# Patient Record
Sex: FEMALE | URBAN - METROPOLITAN AREA
[De-identification: names, ages, dates, MRNs, and addresses within clinical notes are randomized per-mention and may not be internally consistent; named-entity substitution may affect disease eponyms.]

---

## 2019-08-06 ENCOUNTER — CLINICAL SUPPORT (OUTPATIENT)
Dept: URGENT CARE | Facility: CLINIC | Age: 59
End: 2019-08-06

## 2019-08-06 VITALS
HEIGHT: 62 IN | RESPIRATION RATE: 16 BRPM | BODY MASS INDEX: 30.55 KG/M2 | SYSTOLIC BLOOD PRESSURE: 104 MMHG | OXYGEN SATURATION: 97 % | HEART RATE: 81 BPM | DIASTOLIC BLOOD PRESSURE: 70 MMHG | WEIGHT: 166 LBS | TEMPERATURE: 98 F

## 2019-08-06 DIAGNOSIS — R19.7 NAUSEA, VOMITING, AND DIARRHEA: ICD-10-CM

## 2019-08-06 DIAGNOSIS — E11.8 TYPE 2 DIABETES MELLITUS WITH COMPLICATION, UNSPECIFIED WHETHER LONG TERM INSULIN USE: Primary | ICD-10-CM

## 2019-08-06 DIAGNOSIS — R11.2 NAUSEA, VOMITING, AND DIARRHEA: ICD-10-CM

## 2019-08-06 LAB — GLUCOSE SERPL-MCNC: 362 MG/DL (ref 70–110)

## 2019-08-06 PROCEDURE — 82962 GLUCOSE BLOOD TEST: CPT | Mod: ,,, | Performed by: NURSE PRACTITIONER

## 2019-08-06 PROCEDURE — 82962 POCT GLUCOSE, HAND-HELD DEVICE: ICD-10-PCS | Mod: ,,, | Performed by: NURSE PRACTITIONER

## 2019-08-06 PROCEDURE — 99203 PR OFFICE/OUTPT VISIT, NEW, LEVL III, 30-44 MIN: ICD-10-PCS | Mod: 25,S$GLB,, | Performed by: NURSE PRACTITIONER

## 2019-08-06 PROCEDURE — 99203 OFFICE O/P NEW LOW 30 MIN: CPT | Mod: 25,S$GLB,, | Performed by: NURSE PRACTITIONER

## 2019-08-06 RX ORDER — METFORMIN HYDROCHLORIDE 1000 MG/1
1000 TABLET ORAL 2 TIMES DAILY WITH MEALS
COMMUNITY

## 2019-08-06 RX ORDER — GLYBURIDE 1.25 MG/1
5 TABLET ORAL
COMMUNITY

## 2019-08-06 RX ORDER — ONDANSETRON 8 MG/1
8 TABLET, ORALLY DISINTEGRATING ORAL EVERY 8 HOURS PRN
Qty: 9 TABLET | Refills: 0 | Status: SHIPPED | OUTPATIENT
Start: 2019-08-06 | End: 2019-08-09

## 2019-08-06 NOTE — PATIENT INSTRUCTIONS
Take your medications as prescribed. Take over the counter probiotics if you are having diarrhea. Follow up with your PCP if you are not improving in 3-5 days. Be sure to drink plenty of clear fluids to stay hydrated. Eat a bland diet. Return to the emergency department if you have vomiting despite medications, have no urine production or any other concerns. Refer to the additional material provided for further information.      Síndrome Viral [Viral Syndrome: Adult]  Cherelle enfermedad viral puede ocasionar diferentes síntomas que dependen de cuál sea la parte de wilcox cuerpo afectada por el virus. Si el virus se aloja en la nariz, la garganta o los pulmones, puede provocar tos, dolor de garganta, congestión y, en ocasiones, dolor de yamileth. Si se aloja en el estómago o el tracto intestinal, puede provocar vómitos y diarrea. En ocasiones, puede causar síntomas vagos, tales alanna dolor generalizado, sensación de cansancio, pérdida de apetito o fiebre.  Cherelle enfermedad viral suele durar entre cherelle y dos semanas; chichi vez un poco más. En algunos casos, cherelle infección más grave puede verse alanna un síndrome viral rodrigo los primeros días de la enfermedad. Puede que deban hacerle otro examen y análisis adicionales para saber de cuál de los dos casos se trata. Es importante que preste atención a los signos que se describen a continuación.  Cuidados en la casa  · Si los síntomas son graves, descanse en wilcox casa rodrigo los primeros dos o jaciel jerardo.  · Aléjese del humo del cigarrillo, tanto wilcox propio humo alanna el humo de otras personas.  · Puede usar acetaminofén o ibuprofeno para controlar la fiebre, el dolor muscular y el dolor de yamileth, a menos que le hayan recetado otro medicamento para esto. Si tiene cherelle enfermedad renal o hepática crónica o alguna vez tuvo úlcera estomacal o sangrado gastrointestinal, hable con wilcox médico antes de usar estos medicamentos. Ninguna persona saloni de 18 años que esté enferma con fiebre debería  timothy aspirina porque puede provocarle graves daños en el hígado.  · Es posible que tenga poco apetito, por lo que cherelle dieta ligera está celi. Para evitar la deshidratación, jasmyn entre 8 y 12 vasos de ocho onzas (250 cm3) de líquido cada día. Puede ser agua, jugo de naranja y limonada, jugo de manzana, uva y arándano, jugos de frutas samantha, reemplazo de electrolitos y bebidas deportivas, café y tés descafeinados. Si le diagnosticaron cherelle enfermedad renal, pregunte a wilcox médico cuánto y qué tipos de líquidos debería beber para prevenir la deshidratación. Si tiene cherelle enfermedad renal, beber demasiada cantidad de líquidos puede hacer que se acumule en el cuerpo, lo que puede ser peligroso para wilcox sweta.   · Los medicamentos de venta saturnino no reducirán la duración de la enfermedad, ethan pueden ser útiles para aliviar la tos, el dolor de garganta, y la congestión nasal y paranasal. No use descongestionantes si tiene la presión arterial carmelita.  Visitas de control  Dyan cherelle visita de control a wilcox proveedor de atención médica si no empieza a sentirse mejor rodrigo la semana próxima.  Cuándo debe buscar atención médica  Obtenga atención médica de inmediato si algo de lo siguiente ocurre:  · Tos con mucho esputo (mucosidad) de color o con jenna.  · Dolor en el pecho, falta de aire, silbidos o dificultad para respirar.  · Dolor de yamileth luca, dolor en la liza, el danie o los oídos.  · Dolor luca y baljeet en la parte inferior derecha de wilcox abdomen.  · Vómito persistente (no puede mantener los líquidos en el estómago).  · Diarrea frecuente (más de 5 veces al día); jenna (de color rojizo o negruzco) o mucosidad en la diarrea.  · Se siente débil, mareado o a punto de desmayarse.  · Mucha sed.  · Fiebre de 100.4ºF (38ºC) [oral] o más, que no mejora con los medicamentos para la fiebre.  · Convulsiones.   Date Last Reviewed: 9/25/2015  © 7411-7191 [employer]. [address] [place] [address], [place] [address]. Todos  los derechos reservados. Esta información no pretende sustituir la atención médica profesional. Sólo wilcox médico puede diagnosticar y tratar un problema de sweta.        Dieta para el vómito o la diarrea  Si vibha síntomas regresan o empeoran después de comer ciertos alimentos que se enumeran a continuación, debe dejar de comerlos hasta que se alivien los síntomas y usted se sienta mejor.    Nighat vez que pare el vómito, siga los siguientes pasos.  Richard las primeras 12 a 24 horas  Siga esta dieta:  · Bebidas: Agua corriente, bebidas para deportistas alanna soluciónes de electrolito, refrescos sin cafeína, agua mineral (común o saborizada), jugos de fruta samantha, té y café descafeinados.  · Sopas: Caldo mike y consomé.  · Postres: Gelatina común, barras heladas de agua y fruta. Cuando se sienta mejor, puede agregar 6-8 onzas de yogur por día.  Richard las siguientes 24 horas  Richard las siguientes 24 horas puede agregar los siguiente a lo que se mencionó anteriormente:  · Cereal caliente, tostadas solas, pan, panecillos o galletas.  · Tallarines solos, arroz, puré de patatas, sopa de tallarines con radha o de arroz.  · Fruta enlatada sin endulzar (evite la gomez) y bananas.  Limite el consumo de grasas a menos de 15 gramos por día evitando margarinas, manteca, aceites, mayonesa, salsas, condimentos, comidas fritas, mantequilla de maní, carne, radha y pescado.  Limite las fibras: evitando las verduras crudas o cocidas, frutas frescas (excepto bananas) y cereales de salvado.  Limite la cafeína y el chocolate. No use condimentos o especias con excepción de la hsanae.  Richard las siguientes 24 horas  Gradualmente retome la dieta normal a medida que se sienta mejor y desaparezcan los síntomas.  Date Last Reviewed: 11/14/2013  © 8014-5585 [employer]. [address] [place] [address], [place] [address]. Todos los derechos reservados. Esta información no pretende sustituir la atención médica profesional. Sólo wilcox médico puede  diagnosticar y tratar un problema de sweta.        Vómito Y Diarrea, No Específicos (Adultos)  [Vomiting And Diarrhea, Nonspecific (Adult)]    El vómito y la diarrea ayudan al cuerpo a eliminar sustancias dañinas alanna un virus (gastroenteritis) o ciertas bacterias (intoxicación por alimentos). Las alergias a algunos alimentos o medicamentos, así alanna un alto nivel de estrés o de ansiedad, también pueden causar vómito o diarrea. A menudo es difícil identificar la causa exacta de estos trastornos, incluso después de hacer pruebas.  El vómito y la diarrea suelen desaparecer sin problemas en 1 a 2 días. Keena si persisten, pueden causar deshidratación (pérdida excesiva de líquidos), lo cual puede ser un problema grave si se johan sin tratamiento.  Cuidados En [place]  Potter Precauciones Con Los Medicamentos.   Consulte con wilcox proveedor de atención médica antes de timothy medicamentos contra las náuseas o la diarrea. La diarrea y el vómito suelen ser mecanismos que el organismo utiliza para expulsar del cuerpo sustancias tóxicas o dañinas. Por lo tanto, los medicamentos que inhiben o impiden estos mecanismos podrían no ser recomendables. Ciertos medicamentos de venta saturnino pueden ayudar a aliviar el malestar estomacal. Pregunte a wilcox proveedor de atención médica si estos medicamentos pueden ser útiles para usted.  Potter Líquidos Para Evitar La Deshidratación.   · Si está deshidratado, el médico podría sugerirle que tome cherelle solución rehidratante alanna Pedialyte o Rehydralyte, disponibles sin receta en los supermercados y en las farmacias. Use solamente soluciones rehidratantes ya preparadas. No intente prepararlas usted mismo. La mezcla de agua, sal y electrolitos es el mejor método para reemplazar los líquidos y minerales que pierde cuando está enfermo. Si no puede obtener cherelle solución rehidratante, el Gatorade diluido en agua a partes iguales (cherelle taza de agua por cada taza de Gatorade) puede ser un sustituto adecuado.  · Potter  sorbos pequeños y frecuentes para evitar la pérdida excesiva de líquidos. Si johan de vomitar, puede ampliar wilcox dieta, ethan algunas cosas pueden empeorar la diarrea.  · Si la pita idea de beber líquido le produce náuseas, chupe pedazos de hielo.  · Evite las bebidas azucaradas alanna los jugos de fruta y los refrescos, ya que pueden empeorar la diarrea.  Reanude Lentamente Wilcox Dieta Normal.   A medida que va recuperando el apetito, puede empezar a comer lo que le guste. No es necesario que siga cherelle dieta especial. Al principio, evite las comidas pesadas, indigestas o con demasiada grasa, así alanna la leche de dayna. No coma demasiado de cherelle kenney vez. Es posible que le indiquen que evite ciertos alimentos hasta que se sienta mejor.  Lávese Las Melly.   Lavarse las melly a menudo ayuda a prevenir las infecciones.  Dyan cherelle VISITA DE CONTROL según le indique el médico o el personal del centro.  Obtenga Atención Médica Inmediata  en cualquiera de los siguientes casos:  · Vómitos o excrementos con jenna o de color negruzco.  · Dolor abdominal intenso y continuo, o cualquier tipo de dolor abdominal que empeora.  · Dolor de yamileth intenso o rigidez en el danie.  · Richard más de 12 horas no logra retener ni siquiera unos sorbos de líquido.  · Vómito que dura más de 24 horas.  · Diarrea que dura más de 24 horas.  · Fiebre de 100.4°F (38°C) o superior que dura más de 48 horas, o según le indique wilcox proveedor de atención médica.  · Coloración amarillenta en la piel o en el jung de los ojos.  · Señales de deshidratación, alanna resecamiento de la boca, poca orina (orina menos de cherelle vez cada 6 horas) u orina muy oscura.  · Debilidad, mareo o aturdimiento graves.  Date Last Reviewed: 1/3/2016  © 0888-6158 [employer]. [address] [place] [address], [place] [address]. Todos los derechos reservados. Esta información no pretende sustituir la atención médica profesional. Sólo wilcox médico puede diagnosticar y tratar un problema de  sweta.

## 2019-08-06 NOTE — PROGRESS NOTES
Subjective:       Patient ID: Ernestina Winkler is a 58 y.o. female.    Vitals:  vitals were not taken for this visit.     Chief Complaint: Emesis and Diarrhea    Pt has a  in the room that reported for her that she has has n/v/d since yesterday and she reports to feel very weak    Emesis    This is a new problem. The current episode started yesterday. The problem has been gradually worsening. The emesis has an appearance of stomach contents. Associated symptoms include diarrhea. She has tried increased fluids for the symptoms. The treatment provided no relief.   Diarrhea    This is a new problem. The current episode started yesterday. The problem occurs 5 to 10 times per day. The problem has been gradually worsening. The stool consistency is described as watery. The patient states that diarrhea awakens her from sleep. Associated symptoms include vomiting. Nothing aggravates the symptoms. She has tried nothing for the symptoms. The treatment provided no relief.       Gastrointestinal: Positive for vomiting and diarrhea.       Objective:      Physical Exam    Assessment:       No diagnosis found.    Plan:         There are no diagnoses linked to this encounter.

## 2019-08-06 NOTE — PROGRESS NOTES
"Subjective:       Patient ID: Ernestina Winkler is a 58 y.o. female.    Vitals:  height is 5' 2" (1.575 m) and weight is 75.3 kg (166 lb). Her oral temperature is 97.5 °F (36.4 °C). Her blood pressure is 104/70 and her pulse is 81. Her respiration is 16 and oxygen saturation is 97%.     Chief Complaint: Emesis and Diarrhea    Patient's granddaughter is interpreting. Reports patient has vomited one time yesterday and one time today, reports diarrhea only yesterday. Reports abdominal cramping, but denies fever, dizziness, lightheadedness, shortness of breath, and chest pain.       Constitution: Negative for chills, fatigue and fever.   HENT: Negative for congestion and sore throat.    Neck: Negative for painful lymph nodes.   Cardiovascular: Negative for chest pain and leg swelling.   Eyes: Negative for double vision and blurred vision.   Respiratory: Negative for cough and shortness of breath.    Gastrointestinal: Positive for abdominal pain, nausea, vomiting and diarrhea.   Endocrine: negative.   Genitourinary: Negative for dysuria, frequency, urgency and history of kidney stones.   Musculoskeletal: Negative for joint pain, joint swelling, muscle cramps and muscle ache.   Skin: Negative for color change, pale, rash and bruising.   Allergic/Immunologic: Negative for seasonal allergies.   Neurological: Negative for dizziness, history of vertigo, light-headedness, passing out and headaches.   Hematologic/Lymphatic: Negative for swollen lymph nodes.   Psychiatric/Behavioral: Negative for nervous/anxious, sleep disturbance and depression. The patient is not nervous/anxious.        Objective:      Physical Exam   Constitutional: She is oriented to person, place, and time. Vital signs are normal. She appears well-developed and well-nourished. She is cooperative.  Non-toxic appearance. She does not have a sickly appearance. She does not appear ill. No distress.   HENT:   Head: Normocephalic and atraumatic.   Right Ear: " Hearing, tympanic membrane, external ear and ear canal normal.   Left Ear: Hearing, tympanic membrane, external ear and ear canal normal.   Nose: Nose normal. No mucosal edema, rhinorrhea or nasal deformity. No epistaxis. Right sinus exhibits no maxillary sinus tenderness and no frontal sinus tenderness. Left sinus exhibits no maxillary sinus tenderness and no frontal sinus tenderness.   Mouth/Throat: Uvula is midline, oropharynx is clear and moist and mucous membranes are normal. No trismus in the jaw. Normal dentition. No uvula swelling. No posterior oropharyngeal erythema.   Eyes: Conjunctivae and lids are normal. Right eye exhibits no discharge. Left eye exhibits no discharge. No scleral icterus.   Sclera clear bilat   Neck: Trachea normal, normal range of motion, full passive range of motion without pain and phonation normal. Neck supple.   Cardiovascular: Normal rate, regular rhythm, normal heart sounds, intact distal pulses and normal pulses.   Pulmonary/Chest: Effort normal and breath sounds normal. No respiratory distress.   Abdominal: Soft. Normal appearance and bowel sounds are normal. She exhibits no distension, no pulsatile midline mass and no mass. There is no tenderness. There is no rigidity, no rebound, no guarding, no CVA tenderness, no tenderness at McBurney's point and negative Sawant's sign.   Musculoskeletal: Normal range of motion. She exhibits no edema or deformity.   Neurological: She is alert and oriented to person, place, and time. She exhibits normal muscle tone. Coordination normal. GCS eye subscore is 4. GCS verbal subscore is 5. GCS motor subscore is 6.   Skin: Skin is warm, dry and intact. No rash noted. She is not diaphoretic. No pallor.   Psychiatric: She has a normal mood and affect. Her speech is normal and behavior is normal. Judgment and thought content normal. Cognition and memory are normal.   Nursing note and vitals reviewed.      Assessment:       1. Type 2 diabetes mellitus  with complication, unspecified whether long term insulin use    2. Nausea, vomiting, and diarrhea        Plan:       CBG = 362    ROS positive for GI symptoms.  Physical exam reveals patient well appearing in no obvious distress. Mucous membranes are moist; oropharynx clear and free of erythema or edema. Lungs clear, heart regular rate and rhythm. Abdomen is soft and nontender with no rebound, rigidity or CVA tenderness; bowel sound normal. FROM of neck and all extremities with strength 5/5 bilaterally. Skin free of rash, pallor and diaphoresis.     DDX: viral vs bacterial gastroenteritis, GERD    management: patient well appearing, afebrile and no abnormalities on exam. We will send home with antiemetic for symptoms control and instructions on diet for N/V for probable viral etiology of symptoms    Impression/Plan:  The encounter diagnosis was Non-intractable vomiting with nausea, unspecified vomiting type. Discharged home with zofran Patient will follow up with Primary.  Patient cautioned on when to go to ED.  Pt. Understands and agrees with current treatment plan.    Advised patient: Take your medications as prescribed. Take over the counter probiotics if you are having diarrhea. Follow up with your PCP if you are not improving in 3-5 days. Be sure to drink plenty of clear fluids to stay hydrated. Eat a bland diet. Go to the emergency department if you have vomiting despite medications, have no urine production or any other concerns. Refer to the additional material provided for further information.      Type 2 diabetes mellitus with complication, unspecified whether long term insulin use  -     POCT Glucose, Hand-Held Device    Nausea, vomiting, and diarrhea    Other orders  -     ondansetron (ZOFRAN-ODT) 8 MG TbDL; Take 1 tablet (8 mg total) by mouth every 8 (eight) hours as needed.  Dispense: 9 tablet; Refill: 0